# Patient Record
Sex: MALE | Race: OTHER | HISPANIC OR LATINO | URBAN - METROPOLITAN AREA
[De-identification: names, ages, dates, MRNs, and addresses within clinical notes are randomized per-mention and may not be internally consistent; named-entity substitution may affect disease eponyms.]

---
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
## 2020-03-03 NOTE — ED PEDIATRIC NURSE NOTE - CAS DISCH CONDITION
[facility]  [address] [place] [address]    Patient: Ha Bomwan  MRN: [id number]  : 1969    Reason for Visit:   Chief Complaint   Patient presents with   • Follow-up     body pain        History of Present Illness:  aH Bowman is a 50 year old male  Comes to the office for a follow-up.  He had been having severe muscular and bony pains.  He did labs which were essentially normal except for his aldolase which was at the higher end of normal.  His sedimentation rate was normal.  He did respond well to prednisone.  He has been on 40 mg a day which is helped his pain a lot but he is also gaining weight.  Patient understands that prednisone is not a good long-term solution especially in high doses.  He is willing to start to taper down so that he will be tapered out in a few weeks.  If he continues to start having pain after that, will have him see a rheumatologist    Patient did see his cardiologist and had a monitor done which apparently revealed only minor arrhythmia.  He is still on his Eliquis, beta-blocker and flecainide    Patient has been complaining of mild shortness of breath.  His heart testing was okay.  He does not smoke but he does work in a polluted environment and may have been inhaling strange chemicals    Patient has been having a lot of pain in his knees.  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  He may need to have knee replacement in the near future  Review of Systems   Constitutional: Negative.    HENT: Negative.    Eyes: Negative.    Respiratory: Positive for shortness of breath.    Cardiovascular: Negative.    Gastrointestinal: Negative.    Endocrine: Negative.    Genitourinary: Negative.    Musculoskeletal: Positive for arthralgias and myalgias.   Skin: Negative.    Neurological: Negative.    Psychiatric/Behavioral: Negative.        ALLERGIES:  Penicillins and Zinc    Current Outpatient Medications   Medication Sig  Dispense Refill   • predniSONE (DELTASONE) 20 MG tablet Take 1 tablet by mouth 2 times daily. 60 tablet 0   • triamterene-hydrochlorothiazide (DYAZIDE) 37.5-25 MG per capsule TAKE 1 CAPSULE BY MOUTH EVERY MORNING. 30 capsule 0   • furosemide (LASIX) 20 MG tablet TAKE 1 TABLET BY MOUTH DAILY 30 tablet 0   • metoPROLOL tartrate (LOPRESSOR) 25 MG tablet Take 12.5 mg by mouth.     • flecainide (TAMBOCOR) 50 MG tablet Take 50 mg by mouth.     • modafinil (PROVIGIL) 100 MG tablet Take 100 mg by mouth daily.     • Promethazine-Codeine 6.25-10 MG/5ML Solution Take 5 mLs by mouth 4 times daily as needed (cough). 240 mL 0   • acyclovir (ZOVIRAX) 5 % ointment Apply topically every 3 hours. 15 g 0   • gabapentin (NEURONTIN) 300 MG capsule Take 1 capsule by mouth 3 times daily. 90 capsule 3   • tamsulosin (FLOMAX) 0.4 MG Cap TAKE 1 CAPSULE BY MOUTH DAILY AFTER A MEAL 30 capsule 0   • COLCRYS 0.6 MG tablet   0   • ELIQUIS 5 MG Tab Take 5 mg by mouth every 12 hours.  4   • DULoxetine (CYMBALTA) 60 MG capsule Take 1 capsule by mouth daily. 60 capsule 2   • methylPREDNISolone (MEDROL DOSEPAK) 4 MG tablet follow package directions 21 tablet 0   • mometasone (ELOCON) 0.1 % cream APPLY EXTERNALLY TO THE AFFECTED AREA TWICE DAILY 45 g 0   • ketorolac (ACULAR) 0.5 % ophthalmic solution Place 1 drop into both eyes 4 times daily. 5 mL 2   • econazole (SPECTAZOLE) 1 % cream Apply topically daily. 15 g 0   • mupirocin (BACTROBAN) 2 % ointment Apply topically 3 times daily. 22 g 0   • doxycycline hyclate (VIBRAMYCIN) 100 MG capsule Take 1 capsule by mouth 2 times daily. 20 capsule 0   • clindamycin (CLEOCIN) 300 MG capsule Take 1 capsule by mouth 3 times daily. 30 capsule 0   • bisacodyl (DULCOLAX) 5 MG EC tablet Take as directed per colonoscopy prep instructions 4 tablet 0   • levofloxacin (LEVAQUIN) 500 MG tablet Take 1 tablet by mouth daily. 7 tablet 0   • Pyridoxine HCl (B-6) 100 MG Tab      • potassium gluconate 550 (90 K) MG tablet       • mometasone (NASONEX) 50 MCG/ACT nasal spray Spray 2 sprays in each nostril.     • diclofenac (VOLTAREN) 75 MG EC tablet Take 75 mg by mouth.     • betamethasone acet/sod phos (CELESTONE) 6 (3-3) MG/ML injection Inject 6 mg into the articular space.     • acetaminophen (TYLENOL) 500 MG tablet Take 500 mg by mouth.      • sodium hyaluronATE (HEALON) 10 MG/ML ophthalmic injection Inject 10 mg into the articular space.     • PYRIDOXINE HCL PO Take 1 tablet by mouth.     • methylPREDNISolone DEPOT (DEPO-MEDROL) 80 MG/ML injectable suspension 1 ML IM x 1     • GLUCOSAMINE-CHONDROIT-MSM-C-MN PO      • POTASSIUM CHLORIDE PO Take 1 tablet by mouth.     • montelukast (SINGULAIR) 10 MG tablet Take 10 mg by mouth.       No current facility-administered medications for this visit.        Past Medical History:   Diagnosis Date   • Allergy    • Arthritis        Past Surgical History:   Procedure Laterality Date   • Finger surgery     • Knee surgery Right    • Nose surgery     • Shoulder surgery Right    • Tonsillectomy          Family History   Problem Relation Age of Onset   • Cancer Paternal Grandmother         larynx   • Cancer, Lung Paternal Grandmother    • Congestive Heart Failure Paternal Grandfather    • Other Paternal Grandfather         Parkinson's Disease    • Cancer, Lung Other    • Cancer, Esophageal Maternal Grandfather        Social History     Socioeconomic History   • Marital status: /Civil Union     Spouse name: Not on file   • Number of children: Not on file   • Years of education: Not on file   • Highest education level: Not on file   Occupational History   • Not on file   Social Needs   • Financial resource strain: Not on file   • Food insecurity:     Worry: Not on file     Inability: Not on file   • Transportation needs:     Medical: Not on file     Non-medical: Not on file   Tobacco Use   • Smoking status: Never Smoker   • Smokeless tobacco: Never Used   Substance and Sexual Activity   • Alcohol  use: No     Frequency: Never   • Drug use: No   • Sexual activity: Not Currently   Lifestyle   • Physical activity:     Days per week: Not on file     Minutes per session: Not on file   • Stress: Not on file   Relationships   • Social connections:     Talks on phone: Not on file     Gets together: Not on file     Attends [employer] service: Not on file     Active member of club or organization: Not on file     Attends meetings of clubs or organizations: Not on file     Relationship status: Not on file   • Intimate partner violence:     Fear of current or ex partner: Not on file     Emotionally abused: Not on file     Physically abused: Not on file     Forced sexual activity: Not on file   Other Topics Concern   • Not on file   Social History Narrative   • Not on file       BP (!) 146/97 (BP Location: LUE - Left upper extremity)   Pulse 88   Temp 98.2 °F (36.8 °C) (Tympanic)   Resp 20   Ht 6' 3\" (1.905 m)   Wt (!) 170.8 kg (376 lb 9.6 oz)   BMI 47.07 kg/m²   BSA 2.87 m²     Physical Exam   Constitutional: He is oriented to person, place, and time. He appears well-developed and well-nourished.   HENT:   Head: Normocephalic.   Right Ear: External ear normal.   Left Ear: External ear normal.   Nose: Nose normal.   Mouth/Throat: Oropharynx is clear and moist.   Eyes: Pupils are equal, round, and reactive to light. Conjunctivae and EOM are normal.   Neck: Normal range of motion. Neck supple.   Cardiovascular: Normal rate, regular rhythm and normal heart sounds.   Pulmonary/Chest: Breath sounds normal.   Abdominal: Soft. Bowel sounds are normal. Musculoskeletal: Normal range of motion.      Comments: Significant aches and pains     Neurological: He is alert and oriented to person, place, and time. He has normal reflexes.   Skin: Skin is warm and dry.   Psychiatric: He has a normal mood and affect. His behavior is normal. Judgment and thought content normal.       Assessment:   ED Diagnosis   1. Myalgia     2. Low  back pain with radiation     3. Chronic pain of left knee     4. Morbid obesity with BMI of 45.0-49.9, adult (CMS/HCC)     5. New onset atrial fibrillation (CMS/HCC)     6. Atrial flutter, unspecified type (CMS/HCC)     7. Asthma with bronchitis         Plan: He was given additional prednisone and he will taper that down as instructed.  In the meantime he will be given a trial of Breo to see whether that helps his breathing.  Continue present management   Stable